# Patient Record
(demographics unavailable — no encounter records)

---

## 2025-03-07 NOTE — HISTORY OF PRESENT ILLNESS
[de-identified] : No concerns [___ Day(s)] : [unfilled] day(s) [Constant] : constant [de-identified] : L ankle sprain [FreeTextEntry7] : L ankle [FreeTextEntry2] : Improving [FreeTextEntry3] : After basketball injury [FreeTextEntry8] : Running and jumping hurts [FreeTextEntry4] : Resting, icing, elevation helps swelling and pain [de-identified] : Fever, paresthesia, numbness, discoloration [FreeTextEntry6] : Jumped up while playing basketball and landed poorly on ankle. Went to  next day and got Xrays that were negative - diagnosed with ankle sprain. Has been resting at home this week - icing, compressing, and elevating ankle. Has been increasingly bearing weight and able to walk per baseline now. 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

## 2025-03-07 NOTE — DISCUSSION/SUMMARY
[FreeTextEntry1] : Victor Hugo is a 18 yo M with obesity and recently immigrated from [facility] presenting for medical clearance for work/school after L ankle sprain. Sprain was caused by basketball injury after jumping and has responded well to RICE. Xrays at  negative. Patient is able to bear full weight comfortably and has no pain. 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 Cleared to return to school - excused from physical education through 3/17/25. Letter written.  Patient to return to clinic in 1-2 months for 17 yo WCC or sooner if acute concerns arise.

## 2025-03-07 NOTE — REVIEW OF SYSTEMS
[Negative] : Genitourinary [Swelling of Joint] : swelling of joint [Restriction of Motion] : no restriction of motion [Bone Deformity] : no bone deformity [Redness of Joint] : no redness of joint [Changes in Gait] : no changes in gait

## 2025-03-07 NOTE — HISTORY OF PRESENT ILLNESS
[de-identified] : This patient presents today with complaints of left ankle pain and swelling.  States he played basketball approximately 4 days ago.  He states that he came down and inverted the ankle.  Notes severe pain at the time he had to stop playing.  He was noted to have swelling and was seen in urgent care facility where x-rays were obtained.  There was a possible small avulsion fracture from the midfoot noted on the x-ray.  He was placed into an Aircast.  He is noting improvement in the pain and swelling.  He is having some discomfort with weightbearing and climbing stairs.  Overall he is feeling quite a bit better since the initial injury.

## 2025-03-07 NOTE — PHYSICAL EXAM
[Moves All Extremities x 4] : moves all extremities x4 [Warm, Well Perfused x4] : warm, well perfused x4 [Capillary Refill <2s] : capillary refill < 2s [NL] : warm, clear [de-identified] : L ankle with lateral distal purple hyperpigmentation and swelling 2 inches above ankle spreading towards lateral malleolus, no pinpoint tenderness, no pain with flexion or extension of ankles, no pain to deep palpation of L foot [de-identified] : 5/5 extremity strength

## 2025-03-07 NOTE — PHYSICAL EXAM
[Moves All Extremities x 4] : moves all extremities x4 [Warm, Well Perfused x4] : warm, well perfused x4 [Capillary Refill <2s] : capillary refill < 2s [NL] : warm, clear [de-identified] : L ankle with lateral distal purple hyperpigmentation and swelling 2 inches above ankle spreading towards lateral malleolus, no pinpoint tenderness, no pain with flexion or extension of ankles, no pain to deep palpation of L foot [de-identified] : 5/5 extremity strength

## 2025-03-07 NOTE — PHYSICAL EXAM
[de-identified] : The patient appears well nourished  and in no apparent distress.  The patient is alert and oriented to person, place, and time.   Affect and mood appear normal.    The head is normocephalic and atraumatic.  The eyes reveal normal sclera and extra ocular muscles are intact.   The neck appears normal with no jugular venous distention or masses noted.   Skin shows normal turgor with no evidence of eczema or psoriasis.  No respiratory distress noted.  The patient ambulates with an antalgic gait.  The left ankle has mild loss of range of motion.  Mild soft tissue swelling.  Mild tenderness about the lateral ligaments.  Mild ecchymosis noted..  There is no warmth or erythema.    There is no instability to inversion or to anterior drawer.  There is normal strength.  Sensation is intact. Pulses and capillary refill are normal.    No edema or lymphadenopathy noted.   [de-identified] : X-rays were reviewed from urgent care.  AP lateral and mortise views of the ankle reveal the mortise is well-maintained.  No evidence of any ankle fracture.  There is a small calcification which could be due to a avulsion secondary to the sprain.

## 2025-03-07 NOTE — DISCUSSION/SUMMARY
[de-identified] : Patient presents today for evaluation consultation regarding injury sustained to the left ankle approximately 4 days ago.  The physical exam reveals evidence of a mild ankle sprain.  I recommended reduced activities and avoidance of any gym for additional 2 weeks.  I also recommended ice elevation and ibuprofen as needed for pain and swelling.  If symptoms do not continue to improve with the next few weeks I would see him back in the office at that time for follow-up and reevaluation  At least 30 minutes was spent performing the evaluation and management on today's office visit.  This includes but is not limited to preparing to see patient including review of any test results or outside medical records, obtaining and/or reviewing separately obtained history, performing examination and evaluation, counseling and educating the patient on their diagnosis and treatment recommendations, ordering medications, tests, or procedures, documenting clinical information in the electronic health record, independently interpreting results (not separately reported) and communicating results to the patient, and coordination of care.

## 2025-03-07 NOTE — DISCUSSION/SUMMARY
[FreeTextEntry1] : Victor Hugo is a 16 yo M with obesity and recently immigrated from [facility] presenting for medical clearance for work/school after L ankle sprain. Sprain was caused by basketball injury after jumping and has responded well to RICE. Xrays at  negative. Patient is able to bear full weight comfortably and has no pain. 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 Cleared to return to school - excused from physical education through 3/17/25. Letter written.  Patient to return to clinic in 1-2 months for 19 yo WCC or sooner if acute concerns arise.

## 2025-03-07 NOTE — HISTORY OF PRESENT ILLNESS
[de-identified] : No concerns [___ Day(s)] : [unfilled] day(s) [Constant] : constant [de-identified] : L ankle sprain [FreeTextEntry7] : L ankle [FreeTextEntry2] : Improving [FreeTextEntry3] : After basketball injury [FreeTextEntry8] : Running and jumping hurts [FreeTextEntry4] : Resting, icing, elevation helps swelling and pain [de-identified] : Fever, paresthesia, numbness, discoloration [FreeTextEntry6] : Jumped up while playing basketball and landed poorly on ankle. Went to  next day and got Xrays that were negative - diagnosed with ankle sprain. Has been resting at home this week - icing, compressing, and elevating ankle. Has been increasingly bearing weight and able to walk per baseline now. 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

## 2025-03-19 NOTE — DISCUSSION/SUMMARY
[FreeTextEntry1] : Throat pain benign exam rec trial of flonase supportive care measures  ENT referral at moms request

## 2025-03-19 NOTE — HISTORY OF PRESENT ILLNESS
[de-identified] : sore throat [FreeTextEntry6] : otherwise healthy complains of throat pain x 1 day afebrile no uri symptoms po ok  no other pain has this happen every couple of months mom is very worried thinks it could be yeast

## 2025-04-02 NOTE — END OF VISIT
[FreeTextEntry3] : I personally saw and examined JLUIS WILKINSON in detail.I have made changes in changes in the body of the note where appropriate. I personally reviewed the HPI, PMH, FH, SH, ROS and medications/allergies. I have spoken to KRISTINA Husain regarding the history and have personally determined the assessment and plan of care and documented this myself.. I performed all procedures and performed the physical exam. I have made changes in the body of the note where appropriate.   Attesting Faculty: See Attending Signature Below

## 2025-04-02 NOTE — CONSULT LETTER
[Dear  ___] : Dear  [unfilled], [Consult Letter:] : I had the pleasure of evaluating your patient, [unfilled]. [Please see my note below.] : Please see my note below. [Consult Closing:] : Thank you very much for allowing me to participate in the care of this patient.  If you have any questions, please do not hesitate to contact me. [Sincerely,] : Sincerely, [FreeTextEntry3] :  Darrius Ashby MD

## 2025-04-02 NOTE — REASON FOR VISIT
[Initial Evaluation] : an initial evaluation for [Parent] : parent [FreeTextEntry2] : throat discomfort

## 2025-04-02 NOTE — ASSESSMENT
[FreeTextEntry1] : LPRD: - Lifestyle modification and diet  -  Will hold off on Medication at this time since currently asymptomatic - F/U if symptoms return to evaluate and 9/2025.

## 2025-04-02 NOTE — HISTORY OF PRESENT ILLNESS
[de-identified] : 16 y/o M, here with his mother.  They note for the past 2 years has been getting throat discomfort with pain, worse in am, and odynophagia with a white film on tongue and halitosis.  Symptoms last for about 2 weeks then resolve and recur every other month.   No trouble with swallowing.  Pt. moved to the  from [facility] last year. no other modifying factors no other nasal or throat complaints

## 2025-04-02 NOTE — PROCEDURE
[FreeTextEntry3] : Procedure - Cerumen Removal. Indication - Obstructing visualization of TM After informed verbal consent is obtained, cerumen was removed from the          ear canal(s) with a curette and suction.  Normal appearing canal(s) following removal. [de-identified] : Procedure performed: laryngeal Endoscopy- Diagnostic Indication: Globus, throat discomfort. Verbal and/or written consent obtained from patient, parent, and/or guardian Scope #: 08, flexible fiber optic telescope used. Scope was introduced through the nose passed on the floor of the nose to the nasopharynx and then followed down the soft palate to the lower pharynx. The tongue Base, Larynx, Hypopharynx were examined. Base of tongue was symmetric, vallecular was clear, epiglottis was not deformed, subglottis/ pyriform and posterior pharyngeal walls were clear. No erythema, edema, pooling of secretions, masses or lesions. Airway patent, no foreign body visualized. No glottic/supraglottic edema. True vocal cords, arytenoids, vestibular folds, ventricles, pyriform sinuses, and aryepiglottic folds appear normal bilaterally. Vocal cords mobile with good contact b/l.